# Patient Record
Sex: MALE | Race: WHITE | NOT HISPANIC OR LATINO | Employment: OTHER | URBAN - METROPOLITAN AREA
[De-identification: names, ages, dates, MRNs, and addresses within clinical notes are randomized per-mention and may not be internally consistent; named-entity substitution may affect disease eponyms.]

---

## 2018-03-30 ENCOUNTER — PATIENT OUTREACH (OUTPATIENT)
Dept: HEALTH INFORMATION MANAGEMENT | Facility: OTHER | Age: 63
End: 2018-03-30

## 2018-03-30 NOTE — PROGRESS NOTES
Attempt #:Final  [facility] Verified: yes  Verify PCP: yes    Communication Preference Obtained: yes     Annual Wellness Visit Scheduling  1. Scheduling Status:Scheduled     Care Gap Scheduling (Attempt to Schedule EACH Overdue Care Gap!)  Health Maintenance Due   Topic Date Due   • RETINAL SCREENING  04/22/2017   • IMM INFLUENZA (1) 09/01/2017   • DIABETES MONOFILAMENT / LE EXAM  12/28/2017   • FASTING LIPID PROFILE  01/04/2018   • URINE ACR / MICROALBUMIN  01/04/2018   • A1C SCREENING  02/01/2018     MyChart Activation: already active

## 2020-11-18 PROBLEM — D50.9 IRON DEFICIENCY ANEMIA: Status: ACTIVE | Noted: 2020-11-16

## 2020-11-18 PROBLEM — D64.9 ABSOLUTE ANEMIA: Status: ACTIVE | Noted: 2020-11-16

## 2022-06-14 PROBLEM — K74.60 CIRRHOSIS OF LIVER (HCC): Status: ACTIVE | Noted: 2022-03-31

## 2024-05-01 ENCOUNTER — HOSPITAL ENCOUNTER (OUTPATIENT)
Dept: RADIOLOGY | Facility: MEDICAL CENTER | Age: 69
End: 2024-05-01
Attending: INTERNAL MEDICINE
Payer: MEDICARE

## 2024-05-01 NOTE — PROGRESS NOTES
Subjective:   5/7/2024  7:53 AM  Primary care physician: Joseluis Rangel P.A.-C.  Referring Provider: Jeovanny Garay    Chief Complaint:   Chief Complaint   Patient presents with    New Patient     [facility]  LIVER LESIONS  CT: UP TO 10 CM  AFP & AFPL3% +        Diagnosis:   1. Liver lesion  MR-ABDOMEN-WITH & W/O    [id number] BASE TO MID-THIGH    Alpha-Fetoprotein Serum Tumor Marker    Bennie-Gamma-Carboxy Prothrombin    Carbohydrate Antigen 19-9    Carcinoembryonic Antigen    HCG Tumor Marker, Qnt      2. Elevated AFP        3. Alcoholic cirrhosis of liver without ascites (HCC)  MR-ABDOMEN-WITH & W/O    [id number] BASE TO MID-THIGH    Alpha-Fetoprotein Serum Tumor Marker    Bennie-Gamma-Carboxy Prothrombin    Carbohydrate Antigen 19-9    Carcinoembryonic Antigen    HCG Tumor Marker, Qnt      4. Controlled type 2 diabetes mellitus without complication, unspecified whether long term insulin use (HCC)        5. Gastroesophageal reflux disease without esophagitis        6. History of primary testicular cancer  MR-ABDOMEN-WITH & W/O    [id number] BASE TO MID-THIGH    Alpha-Fetoprotein Serum Tumor Marker    Bennie-Gamma-Carboxy Prothrombin    Carbohydrate Antigen 19-9    Carcinoembryonic Antigen    HCG Tumor Marker, Qnt        History of presenting illness:    Jamin Zamorano is a pleasant 68 y.o. male with history including iron deficiency, high testosterone and thrombocytopenia - due to his cirrhosis. The patient has a history of thrombocytopenia since 2014. This is felt to be due to his cirrhosis. His platelets run from 68,000 to 110,000.     CT LIVER on 4/19/24 noted cirrhosis with sequela of portal hypertension such as splenomegaly and trace ascites. Areas of heterogeneous masslike enhancement measuring up to 10 cm are suspicious for hepatomas, LR4, due to size. Metastatic lesions are also in the differential.  Partially visualized colonic diverticulosis.    US  ABDOMEN on 3/4/24 noted heterogeneous liver echotexture likely related to chronic liver disease. A few hypoattenuating lesions are seen bilaterally which are indeterminate. Splenomegaly possibly from portal hypertension.    Labs on 2/1/24 notable for AFP: 16 and AFP L3%: 58.3 - both elevated    He is here today for for evaluation of this newly identified liver mass.  Patient has known about his cirrhosis for a few years, felt to be related to EtOH and prior chemotherapy.  He has known gastric varices.  This was identified during surveillance with his GI doctor, initially on US then on CT scan.  Elevated tumor markers.  He has a history of testicular cancer diagnosed in 1983 for which he underwent an orchiectomy, RPLND, lung resection (right sided metastasis).  He then received systemic chemotherapy.  He has been without disease since that treatment.  Last EGD was this year.  Last colonoscopy a couple years ago.  Does not smoke, no longer drinks.  No family history.    Past Medical History:   Diagnosis Date    Arthritis     Breath shortness     Cancer (HCC) 1983    testicular cancer    Diabetes     oral meds    Elevated LFTs     Fibrosis of lung (HCC) 1983    following chemotherapy treatment    Hyperlipidemia     Hypertension     Indigestion     Pain 11-02-15    lower back, 5/10    Pneumonia 2013    Snoring     has had sleep study    Temporary low platelet count (HCC)     hx of low platelet count     Past Surgical History:   Procedure Laterality Date    CERVICAL DISK AND FUSION ANTERIOR  12/8/2014    Performed by Jamal Bhakta M.D. at SURGERY [facility]    OTHER  1983    lymph node dissection following orchiectomy    APPENDECTOMY      CHOLECYSTECTOMY      ORCHIECTOMY      OTHER NEUROLOGICAL SURG      lumbar surgery x 2    TONSILLECTOMY       No Known Allergies  Outpatient Encounter Medications as of 5/7/2024   Medication Sig Dispense Refill    losartan (COZAAR) 50 MG Tab Take 1 Tablet by mouth every day. 90  Tablet 3    metformin (GLUCOPHAGE) 1000 MG tablet TAKE 1 TABLET BY MOUTH TWICE DAILY WITH MEALS 180 Tablet 3    oxyCODONE-acetaminophen (PERCOCET-10)  MG Tab       ibuprofen (MOTRIN) 200 MG Tab Take 200 mg by mouth every 6 hours as needed.      therapeutic multivitamin-minerals (THERAGRAN-M) TABS Take 1 Tablet by mouth every day.       No facility-administered encounter medications on file as of 2024.     Social History     Socioeconomic History    Marital status:      Spouse name: Not on file    Number of children: Not on file    Years of education: Not on file    Highest education level: Not on file   Occupational History    Not on file   Tobacco Use    Smoking status: Former     Current packs/day: 0.00     Average packs/day: 0.3 packs/day for 20.0 years (5.0 ttl pk-yrs)     Types: Cigarettes     Start date: 1975     Quit date: 1995     Years since quittin.3    Smokeless tobacco: Never   Vaping Use    Vaping Use: Never used   Substance and Sexual Activity    Alcohol use: Yes     Alcohol/week: 2.0 oz     Types: 4 Shots of liquor per week     Comment: 2 a day    Drug use: No    Sexual activity: Not on file   Other Topics Concern    Not on file   Social History Narrative    Not on file     Social Determinants of Health     Financial Resource Strain: Not on file   Food Insecurity: Not on file   Transportation Needs: Not on file   Physical Activity: Not on file   Stress: Not on file   Social Connections: Not on file   Intimate Partner Violence: Not on file   Housing Stability: Not on file      Social History     Tobacco Use   Smoking Status Former    Current packs/day: 0.00    Average packs/day: 0.3 packs/day for 20.0 years (5.0 ttl pk-yrs)    Types: Cigarettes    Start date: 1975    Quit date: 1995    Years since quittin.3   Smokeless Tobacco Never     Social History     Substance and Sexual Activity   Alcohol Use Yes    Alcohol/week: 2.0 oz    Types: 4 Shots of liquor per  "week    Comment: 2 a day     Social History     Substance and Sexual Activity   Drug Use No      Family History   Problem Relation Age of Onset    Heart Disease Father      Review of Systems   Constitutional:  Negative for chills, fever, malaise/fatigue and weight loss.   HENT:  Negative for congestion, ear discharge, ear pain, hearing loss and nosebleeds.    Eyes:  Negative for blurred vision, double vision, photophobia, pain and discharge.   Respiratory:  Negative for cough, hemoptysis and sputum production.    Cardiovascular:  Negative for chest pain, palpitations, orthopnea and claudication.   Gastrointestinal:  Negative for abdominal pain, constipation, diarrhea, heartburn, nausea and vomiting.   Genitourinary:  Negative for dysuria, frequency, hematuria and urgency.   Musculoskeletal:  Negative for back pain, joint pain, myalgias and neck pain.   Skin:  Negative for itching and rash.   Neurological:  Negative for dizziness, tingling, tremors, sensory change, speech change, focal weakness and headaches.   Endo/Heme/Allergies:  Negative for environmental allergies. Does not bruise/bleed easily.   Psychiatric/Behavioral:  Negative for depression, hallucinations, substance abuse and suicidal ideas. The patient is not nervous/anxious.         Objective:   /78 (BP Location: Right arm, Patient Position: Sitting)   Pulse 91   Temp 36.8 °C (98.2 °F) (Temporal)   Ht 1.702 m (5' 7\")   Wt 88.9 kg (196 lb)   SpO2 91%   BMI 30.70 kg/m²     Physical Exam  Constitutional:       General: He is not in acute distress.     Appearance: He is not ill-appearing.   HENT:      Head: Normocephalic.   Eyes:      General: No scleral icterus.     Pupils: Pupils are equal, round, and reactive to light.   Cardiovascular:      Rate and Rhythm: Normal rate and regular rhythm.   Pulmonary:      Effort: Pulmonary effort is normal. No respiratory distress.   Abdominal:      General: Abdomen is flat. There is no distension.      " Palpations: Abdomen is soft.      Tenderness: There is no abdominal tenderness.   Skin:     Coloration: Skin is not jaundiced.   Neurological:      General: No focal deficit present.      Mental Status: He is alert and oriented to person, place, and time.         Labs  Component 02/01/24 01/09/24 09/22/23 06/27/23 06/27/23 06/15/23   WBC Count 4.4 3.8 3.7 4.0 -- 4.7   RBC 4.11 Low  4.13 Low  4.09 Low  4.83 -- 4.92   Hemoglobin 12.5 Low  13.0 13.1 15.1 -- 15.6   Hematocrit 37.6 Low  38.7 Low  38.5 Low  45.3 -- 45.5   Mean Corpuscular Volume 91.6 93.6 94.0 93.9 -- 92.5   Mean Corpuscular Hemoglobin 30.5 31.5 31.9 31.3 -- 31.7   Mean Corpuscular HGB Concentration 33.3 33.6 34.0 33.3 -- 34.2   Red Cell Distribution Width 13.9 14.3 High  14.8 High  16.7 High  -- 16.7 High    Platelet Count 92 Low  59 Low  58 Low  53 Low  -- 66 Low    Mean Platelet Volume 10.6 High  10.6 High  10.6 High  11.1 High  -- 10.0   Neutrophil % 71.0 69.7 68.3 -- -- 69.2   Lymphocyte % 16.7 17.2 16.8 -- 19 18.1   Monocyte % 7.7 8.9 10.2 -- 10 9.6   Eosinophil % 4.2 3.8 4.4 -- 3 2.8   Basophil % 0.4 0.4 0.3 -- 1 0.3   Neutrophil # 3.20 2.70 2.50 -- -- 3.20   Lymphocyte # 0.70 Low  0.70 Low  0.60 Low  -- 0.76 Low  0.80 Low    Monocyte # 0.30 0.30 0.40 -- 0.40 0.40   Eosinophil # 0.20 0.10 0.20 -- 0.12 0.10   Basophil # 0.00 0.00 0.00 -- 0.04 0.00     Component 02/01/24 06/15/23 03/30/22 11/16/21 11/16/21 09/03/21   Sodium S/P 138 138 137 -- -- --   Potassium 4.5 5.4 High  4.4 -- -- 4.4   Chloride 103 104 105 -- -- 106   Carbon Dioxide 28 28 25 -- -- --   Alkaline Phosphatase 87 72 66 -- -- --   AST 35 30 20 -- -- 19   ALT 44 40 21 -- -- 20   BUN 12 12 15 14 -- 12   Creatinine S/P 0.94 0.97 0.90 -- 0.95 --   Calcium 9.5 9.5 9.2 -- -- 9.4   Protein, Total, S/P 6.5 6.4 6.5 -- -- --   Albumin 3.9 4.0 3.8 -- -- 4.2   A/G Ratio 1.5 1.7 1.4 -- -- --   Glomerular Filtration Rate (GFR) 79  77  84  -- -- --   Globulins, Total 2.6 2.4 Low  2.7 -- -- --    Glucose S/P 151 High  106 High  111 High  -- -- --   Bilirubin, Total 0.7 1.7 0.8 -- -- 0.8   Anion Gap 7 6 7 -- -- 7      AFP on 2/1/24: 16 - elevated    AFP L3% on 2/1/24: 58 - elevated    Imaging  CT LIVER (4/19/24)  Impression  1.  Cirrhosis with sequela of portal hypertension such as splenomegaly and trace  ascites. Areas of heterogeneous masslike enhancement measuring up to 10 cm are  suspicious for hepatomas, LR4, due to size. Metastatic lesions are also in the  differential. Consider follow-up with MRI with Eovist IV contrast.  2.  Partially visualized colonic diverticulosis.    US ABDOMEN (3/4/24)  Impression  1.  Heterogeneous liver echotexture likely related to chronic liver disease.    2.  A few hypoattenuating lesions are seen bilaterally which are indeterminate.  Recommend a CT of the abdomen and pelvis without contrast (liver mass protocol)  for further evaluation.    3.  Splenomegaly possibly from portal hypertension.      Pathology  N/A    Procedures  EGD (3/22/24)        Diagnosis:     1. Liver lesion  MR-ABDOMEN-WITH & W/O    [id number] BASE TO MID-THIGH    Alpha-Fetoprotein Serum Tumor Marker    Bennie-Gamma-Carboxy Prothrombin    Carbohydrate Antigen 19-9    Carcinoembryonic Antigen    HCG Tumor Marker, Qnt      2. Elevated AFP        3. Alcoholic cirrhosis of liver without ascites (HCC)  MR-ABDOMEN-WITH & W/O    [id number] BASE TO MID-THIGH    Alpha-Fetoprotein Serum Tumor Marker    Bennie-Gamma-Carboxy Prothrombin    Carbohydrate Antigen 19-9    Carcinoembryonic Antigen    HCG Tumor Marker, Qnt      4. Controlled type 2 diabetes mellitus without complication, unspecified whether long term insulin use (HCC)        5. Gastroesophageal reflux disease without esophagitis        6. History of primary testicular cancer  MR-ABDOMEN-WITH & W/O    [id number] BASE TO MID-THIGH    Alpha-Fetoprotein Serum Tumor Marker    Bennie-Gamma-Carboxy Prothrombin    Carbohydrate Antigen 19-9     Carcinoembryonic Antigen    HCG Tumor Marker, Qnt          Medical Decision Making:  Today's Assessment / Status / Plan:     69M with history of testicular cancer in 1983 s/p orchiectomy, RPLND, right lung resection (metastasis) followed by chemotherapy, cirrhosis 2/2 EtOH and chemotherapy who presents with a newly identified right sided liver mass, elevated AFP, L3%.    LIRADS 4 lesion on CT scan, showed patient his images today.  Need to discern if this is HCC vs testicular recurrence vs other metastasitc lesion vs cholangio.  Will order CT guided liver biopsy, MRI, PET scan, repeat tumor markers.  Once we have a diagnosis will determine treatment plan.  If HCC discussed unresectable due to cirrhosis would plan for liver directed therapy.    I, Brad Sam MD have entered, reviewed and confirmed the above diagnosis related to this patient on this date of service, May 7, 2024     Brad Sam M.D.

## 2024-05-07 ENCOUNTER — TELEPHONE (OUTPATIENT)
Dept: SURGICAL ONCOLOGY | Facility: MEDICAL CENTER | Age: 69
End: 2024-05-07

## 2024-05-07 ENCOUNTER — OFFICE VISIT (OUTPATIENT)
Dept: SURGICAL ONCOLOGY | Facility: MEDICAL CENTER | Age: 69
End: 2024-05-07
Payer: MEDICARE

## 2024-05-07 VITALS
BODY MASS INDEX: 30.76 KG/M2 | OXYGEN SATURATION: 91 % | HEART RATE: 91 BPM | TEMPERATURE: 98.2 F | SYSTOLIC BLOOD PRESSURE: 138 MMHG | DIASTOLIC BLOOD PRESSURE: 78 MMHG | HEIGHT: 67 IN | WEIGHT: 196 LBS

## 2024-05-07 DIAGNOSIS — K21.9 GASTROESOPHAGEAL REFLUX DISEASE WITHOUT ESOPHAGITIS: ICD-10-CM

## 2024-05-07 DIAGNOSIS — K76.9 LIVER LESION: ICD-10-CM

## 2024-05-07 DIAGNOSIS — E11.9 CONTROLLED TYPE 2 DIABETES MELLITUS WITHOUT COMPLICATION, UNSPECIFIED WHETHER LONG TERM INSULIN USE (HCC): ICD-10-CM

## 2024-05-07 DIAGNOSIS — R77.2 ELEVATED AFP: ICD-10-CM

## 2024-05-07 DIAGNOSIS — K70.30 ALCOHOLIC CIRRHOSIS OF LIVER WITHOUT ASCITES (HCC): ICD-10-CM

## 2024-05-07 DIAGNOSIS — Z85.47 HISTORY OF PRIMARY TESTICULAR CANCER: ICD-10-CM

## 2024-05-07 PROCEDURE — 99204 OFFICE O/P NEW MOD 45 MIN: CPT | Performed by: SURGERY

## 2024-05-07 PROCEDURE — 3075F SYST BP GE 130 - 139MM HG: CPT | Performed by: SURGERY

## 2024-05-07 PROCEDURE — 3078F DIAST BP <80 MM HG: CPT | Performed by: SURGERY

## 2024-05-07 ASSESSMENT — ENCOUNTER SYMPTOMS
NERVOUS/ANXIOUS: 0
HEADACHES: 0
MYALGIAS: 0
SENSORY CHANGE: 0
EYE DISCHARGE: 0
WEIGHT LOSS: 0
TINGLING: 0
PHOTOPHOBIA: 0
FEVER: 0
FOCAL WEAKNESS: 0
BLURRED VISION: 0
DIARRHEA: 0
ORTHOPNEA: 0
DOUBLE VISION: 0
SPEECH CHANGE: 0
EYE PAIN: 0
CLAUDICATION: 0
PALPITATIONS: 0
TREMORS: 0
HEMOPTYSIS: 0
SPUTUM PRODUCTION: 0
NAUSEA: 0
VOMITING: 0
DEPRESSION: 0
DIZZINESS: 0
ABDOMINAL PAIN: 0
CHILLS: 0
HALLUCINATIONS: 0
NECK PAIN: 0
COUGH: 0
BACK PAIN: 0
CONSTIPATION: 0
HEARTBURN: 0
BRUISES/BLEEDS EASILY: 0

## 2024-05-07 ASSESSMENT — LIFESTYLE VARIABLES: SUBSTANCE_ABUSE: 0

## 2024-05-07 ASSESSMENT — FIBROSIS 4 INDEX: FIB4 SCORE: 3.66

## 2024-05-28 ENCOUNTER — TELEPHONE (OUTPATIENT)
Dept: SURGICAL ONCOLOGY | Facility: MEDICAL CENTER | Age: 69
End: 2024-05-28
Payer: MEDICARE

## 2024-06-12 NOTE — PROGRESS NOTES
Subjective:   6/26/2024  9:19 AM  Primary care physician: Joseluis Rangel P.A.-C.  Referring Provider: Jeovanny Garay     Chief Complaint:   Chief Complaint   Patient presents with    Follow-Up     HCC VS TESTICULAR RECURRENCE VS METASTATIC LESION VS CHOLANGIO  MRI 6/13  NO LIVER BX - DENIED BY INSURANCE        Diagnosis:   1. Liver lesion  IR-LIVER BX PROCEDURE      2. Elevated AFP        3. Alcoholic cirrhosis of liver without ascites (HCC)        4. Gastroesophageal reflux disease without esophagitis        5. Controlled type 2 diabetes mellitus without complication, unspecified whether long term insulin use (HCC)          History of presenting illness:    Jamin Zamorano is a pleasant 68 y.o. male with history including iron deficiency, high testosterone and thrombocytopenia - due to his cirrhosis. The patient has a history of thrombocytopenia since 2014. This is felt to be due to his cirrhosis. His platelets run from 68,000 to 110,000.      CT LIVER on 4/19/24 noted cirrhosis with sequela of portal hypertension such as splenomegaly and trace ascites. Areas of heterogeneous masslike enhancement measuring up to 10 cm are suspicious for hepatomas, LR4, due to size. Metastatic lesions are also in the differential.  Partially visualized colonic diverticulosis.     US ABDOMEN on 3/4/24 noted heterogeneous liver echotexture likely related to chronic liver disease. A few hypoattenuating lesions are seen bilaterally which are indeterminate. Splenomegaly possibly from portal hypertension.     Labs on 2/1/24 notable for AFP: 16 and AFP L3%: 58.3 - both elevated     He is here today for for evaluation of this newly identified liver mass.  Patient has known about his cirrhosis for a few years, felt to be related to EtOH and prior chemotherapy.  He has known gastric varices.  This was identified during surveillance with his GI doctor, initially on US then on CT scan.   Elevated tumor markers.  He has a history of testicular cancer diagnosed in 1983 for which he underwent an orchiectomy, RPLND, lung resection (right sided metastasis).  He then received systemic chemotherapy.  He has been without disease since that treatment.  Last EGD was this year.  Last colonoscopy a couple years ago.  Does not smoke, no longer drinks.  No family history.    Update 6/26/24  CT PET on 5/31/24 noted no evidence of hypermetabolic metastatic disease. Normal PET CT appearance of the liver however hepatocellular carcinoma is often not FDG active.     MR ABDOMEN on 6/13/24 noted multifocal arterial enhancing lesions with washout scattered throughout the hepatic segments, with dominant mass extending along the posterior medial right   lobe at segment 6 and 7. Most concerning for multifocal hepatocellular carcinoma   in the setting of hepatocellular disease/cirrhosis.  LIRADS 5. Mildly enlarged, potential metastatic disease in the periportal region lymph nodes. Moderate to marked splenomegaly and other sequela of portal hypertension.     He is here today for follow-up.  In general he has been fatigued and stressed in regards to what is going on in his liver.  He has not had any major new issues since I last saw him in the office.  We did discuss his MRI and PET scans today in clinic.    Past Medical History:   Diagnosis Date    Arthritis     Breath shortness     Cancer (HCC) 1983    testicular cancer    Diabetes     oral meds    Elevated LFTs     Fibrosis of lung (HCC) 1983    following chemotherapy treatment    Hyperlipidemia     Hypertension     Indigestion     Pain 11-02-15    lower back, 5/10    Pneumonia 2013    Snoring     has had sleep study    Temporary low platelet count (HCC)     hx of low platelet count     Past Surgical History:   Procedure Laterality Date    CERVICAL DISK AND FUSION ANTERIOR  12/8/2014    Performed by Jamal Bhakta M.D. at SURGERY [facility]  1983    lymph  node dissection following orchiectomy    APPENDECTOMY      CHOLECYSTECTOMY      ORCHIECTOMY      OTHER NEUROLOGICAL SURG      lumbar surgery x 2    TONSILLECTOMY       No Known Allergies  Outpatient Encounter Medications as of 2024   Medication Sig Dispense Refill    losartan (COZAAR) 50 MG Tab Take 1 Tablet by mouth every day. 90 Tablet 3    metformin (GLUCOPHAGE) 1000 MG tablet TAKE 1 TABLET BY MOUTH TWICE DAILY WITH MEALS 180 Tablet 3    oxyCODONE-acetaminophen (PERCOCET-10)  MG Tab       ibuprofen (MOTRIN) 200 MG Tab Take 200 mg by mouth every 6 hours as needed.      therapeutic multivitamin-minerals (THERAGRAN-M) TABS Take 1 Tablet by mouth every day.       No facility-administered encounter medications on file as of 2024.     Social History     Socioeconomic History    Marital status:      Spouse name: Not on file    Number of children: Not on file    Years of education: Not on file    Highest education level: Not on file   Occupational History    Not on file   Tobacco Use    Smoking status: Former     Current packs/day: 0.00     Average packs/day: 0.3 packs/day for 20.0 years (5.0 ttl pk-yrs)     Types: Cigarettes     Start date: 1975     Quit date: 1995     Years since quittin.5    Smokeless tobacco: Never   Vaping Use    Vaping status: Never Used   Substance and Sexual Activity    Alcohol use: Yes     Alcohol/week: 2.0 oz     Types: 4 Shots of liquor per week     Comment: 2 a day    Drug use: No    Sexual activity: Not on file   Other Topics Concern    Not on file   Social History Narrative    Not on file     Social Determinants of Health     Financial Resource Strain: Low Risk  (2023)    Received from [facility]    Overall Financial Resource Strain (CARDIA)     Difficulty of Paying Living Expenses: Not hard at all   Food Insecurity: No Food Insecurity (2023)    Received from [facility]    Hunger Vital Sign     Worried About Running  Out of Food in the Last Year: Never true     Ran Out of Food in the Last Year: Never true   Transportation Needs: No Transportation Needs (9/25/2023)    Received from [facility]    PRAPARE - Transportation     Lack of Transportation (Medical): No     Lack of Transportation (Non-Medical): No   Physical Activity: Patient Declined (3/22/2023)    Received from [facility]    Exercise Vital Sign     Days of Exercise per Week: Patient declined     Minutes of Exercise per Session: Patient declined   Stress: Patient Declined (3/22/2023)    Received from [facility]    [employer] - Occupational Stress Questionnaire     Feeling of Stress : Patient declined   Social Connections: Patient Declined (3/22/2023)    Received from [facility]    Social Connection and Isolation Panel [NHANES]     Frequency of Communication with Friends and Family: Patient declined     Frequency of Social Gatherings with Friends and Family: Patient declined     Attends Christianity Services: Patient declined     Active Member of Clubs or Organizations: Patient declined     Attends Club or Organization Meetings: Patient declined     Marital Status: Patient declined   Intimate Partner Violence: Patient Declined (3/22/2023)    Received from [facility]    Humiliation, Afraid, Rape, and Kick questionnaire     Fear of Current or Ex-Partner: Patient declined     Emotionally Abused: Patient declined     Physically Abused: Patient declined     Sexually Abused: Patient declined   Housing Stability: Unknown (3/22/2023)    Received from [facility]    Housing Stability Vital Sign     Unable to Pay for Housing in the Last Year: No     Number of Places Lived in the Last Year: Not on file     Unstable Housing in the Last Year: No      Social History     Tobacco Use   Smoking Status Former    Current packs/day: 0.00    Average packs/day: 0.3 packs/day for  "20.0 years (5.0 ttl pk-yrs)    Types: Cigarettes    Start date: 1975    Quit date: 1995    Years since quittin.5   Smokeless Tobacco Never     Social History     Substance and Sexual Activity   Alcohol Use Yes    Alcohol/week: 2.0 oz    Types: 4 Shots of liquor per week    Comment: 2 a day     Social History     Substance and Sexual Activity   Drug Use No      Family History   Problem Relation Age of Onset    Heart Disease Father      Review of Systems   Constitutional:  Positive for malaise/fatigue. Negative for chills, fever and weight loss.   HENT:  Negative for congestion, ear discharge, ear pain, hearing loss and nosebleeds.    Eyes:  Negative for blurred vision, double vision, photophobia, pain and discharge.   Respiratory:  Negative for cough, hemoptysis and sputum production.    Cardiovascular:  Negative for chest pain, palpitations, orthopnea and claudication.   Gastrointestinal:  Negative for abdominal pain, constipation, diarrhea, heartburn, nausea and vomiting.   Genitourinary:  Negative for dysuria, frequency, hematuria and urgency.   Musculoskeletal:  Negative for back pain, joint pain, myalgias and neck pain.   Skin:  Negative for itching and rash.   Neurological:  Negative for dizziness, tingling, tremors, sensory change, speech change, focal weakness and headaches.   Endo/Heme/Allergies:  Negative for environmental allergies. Does not bruise/bleed easily.   Psychiatric/Behavioral:  Negative for depression, hallucinations, substance abuse and suicidal ideas. The patient is not nervous/anxious.         Objective:   /66 (BP Location: Left arm, Patient Position: Sitting, BP Cuff Size: Small adult)   Pulse 85   Temp 37.2 °C (98.9 °F) (Temporal)   Ht 1.702 m (5' 7\")   Wt 87.5 kg (193 lb)   SpO2 96%   BMI 30.23 kg/m²     Physical Exam  Constitutional:       General: He is not in acute distress.     Appearance: He is not ill-appearing.   HENT:      Head: Normocephalic.   Eyes:      " General: No scleral icterus.     Pupils: Pupils are equal, round, and reactive to light.   Cardiovascular:      Rate and Rhythm: Normal rate and regular rhythm.   Pulmonary:      Effort: Pulmonary effort is normal. No respiratory distress.   Abdominal:      General: Abdomen is flat. There is no distension.      Palpations: Abdomen is soft.      Tenderness: There is no abdominal tenderness.   Skin:     Coloration: Skin is not jaundiced.   Neurological:      General: No focal deficit present.      Mental Status: He is alert and oriented to person, place, and time.         Labs  Component 02/01/24 01/09/24 09/22/23 06/27/23 06/27/23 06/15/23   WBC Count 4.4 3.8 3.7 4.0 -- 4.7   RBC 4.11 Low  4.13 Low  4.09 Low  4.83 -- 4.92   Hemoglobin 12.5 Low  13.0 13.1 15.1 -- 15.6   Hematocrit 37.6 Low  38.7 Low  38.5 Low  45.3 -- 45.5   Mean Corpuscular Volume 91.6 93.6 94.0 93.9 -- 92.5   Mean Corpuscular Hemoglobin 30.5 31.5 31.9 31.3 -- 31.7   Mean Corpuscular HGB Concentration 33.3 33.6 34.0 33.3 -- 34.2   Red Cell Distribution Width 13.9 14.3 High  14.8 High  16.7 High  -- 16.7 High    Platelet Count 92 Low  59 Low  58 Low  53 Low  -- 66 Low    Mean Platelet Volume 10.6 High  10.6 High  10.6 High  11.1 High  -- 10.0   Neutrophil % 71.0 69.7 68.3 -- -- 69.2   Lymphocyte % 16.7 17.2 16.8 -- 19 18.1   Monocyte % 7.7 8.9 10.2 -- 10 9.6   Eosinophil % 4.2 3.8 4.4 -- 3 2.8   Basophil % 0.4 0.4 0.3 -- 1 0.3   Neutrophil # 3.20 2.70 2.50 -- -- 3.20   Lymphocyte # 0.70 Low  0.70 Low  0.60 Low  -- 0.76 Low  0.80 Low    Monocyte # 0.30 0.30 0.40 -- 0.40 0.40   Eosinophil # 0.20 0.10 0.20 -- 0.12 0.10   Basophil # 0.00 0.00 0.00 -- 0.04 0.00      Component 02/01/24 06/15/23 03/30/22 11/16/21 11/16/21 09/03/21   Sodium S/P 138 138 137 -- -- --   Potassium 4.5 5.4 High  4.4 -- -- 4.4   Chloride 103 104 105 -- -- 106   Carbon Dioxide 28 28 25 -- -- --   Alkaline Phosphatase 87 72 66 -- -- --   AST 35 30 20 -- -- 19   ALT 44 40 21 -- -- 20    BUN 12 12 15 14 -- 12   Creatinine S/P 0.94 0.97 0.90 -- 0.95 --   Calcium 9.5 9.5 9.2 -- -- 9.4   Protein, Total, S/P 6.5 6.4 6.5 -- -- --   Albumin 3.9 4.0 3.8 -- -- 4.2   A/G Ratio 1.5 1.7 1.4 -- -- --   Glomerular Filtration Rate (GFR) 79  77  84  -- -- --   Globulins, Total 2.6 2.4 Low  2.7 -- -- --   Glucose S/P 151 High  106 High  111 High  -- -- --   Bilirubin, Total 0.7 1.7 0.8 -- -- 0.8   Anion Gap 7 6 7 -- -- 7      AFP on 2/1/24: 16 - elevated     AFP L3% on 2/1/24: 58 - elevated     Imaging  MR ABDOMEN (6/13/24)  IMPRESSION  1.  Multifocal arterial enhancing lesions with washout scattered throughout the   hepatic segments, with dominant mass extending along the posterior medial right   lobe at segment 6 and 7. Most concerning for multifocal hepatocellular carcinoma   in the setting of hepatocellular disease/cirrhosis.  LIRADS 5.   2.  Mildly enlarged, potential metastatic disease in the periportal region lymph   nodes.   3. Moderate to marked splenomegaly and other sequela of portal hypertension.     CT PET (5/31/24)  Impression    1.  No evidence of hypermetabolic metastatic disease.    2.  Normal PET CT appearance of the liver however hepatocellular carcinoma is  often not FDG active. Consider continued follow-up with liver protocol MRI or  CT.    CT LIVER (4/19/24)  Impression  1.  Cirrhosis with sequela of portal hypertension such as splenomegaly and trace  ascites. Areas of heterogeneous masslike enhancement measuring up to 10 cm are  suspicious for hepatomas, LR4, due to size. Metastatic lesions are also in the  differential. Consider follow-up with MRI with Eovist IV contrast.  2.  Partially visualized colonic diverticulosis.     US ABDOMEN (3/4/24)  Impression  1.  Heterogeneous liver echotexture likely related to chronic liver disease.     2.  A few hypoattenuating lesions are seen bilaterally which are indeterminate.  Recommend a CT of the abdomen and pelvis without contrast (liver mass  protocol)  for further evaluation.     3.  Splenomegaly possibly from portal hypertension.        Pathology  N/A     Procedures    EGD (3/22/24)         Diagnosis:     1. Liver lesion  IR-LIVER BX PROCEDURE      2. Elevated AFP        3. Alcoholic cirrhosis of liver without ascites (HCC)        4. Gastroesophageal reflux disease without esophagitis        5. Controlled type 2 diabetes mellitus without complication, unspecified whether long term insulin use (HCC)            Medical Decision Making:  Today's Assessment / Status / Plan:     69M with history of testicular cancer in 1983 s/p orchiectomy, RPLND, right lung resection (metastasis) followed by chemotherapy, cirrhosis 2/2 EtOH and chemotherapy who presents with a newly identified right sided liver mass, elevated AFP, L3%.     Today in clinic we discussed his PET scan and his MRI.  On his MRI it does appear that this would be most consistent with hepatocellular carcinoma with multifocal disease on the right side.  I still do have concern given his history of aggressive testicular cancer that this could represent metastasis.  I do think he still needs a liver biopsy in order to determine exactly what were dealing with in order to appropriately guide management.  I think regardless if this is HCC or recurrent testicular cancer he is likely going to need systemic therapy given the multifocal nature in the liver.  He may be a candidate down the line for liver directed therapies depending on his response to systemic therapy.  I put him on the presented tumor board next week, I placed referral to medical oncology to get him established and he will undergo a liver biopsy.  Once we have those results we will finalize the treatment plan of care.    I, Brad Sam MD have entered, reviewed and confirmed the above diagnosis related to this patient on this date of service, June 26, 2024     Brad aSm M.D.

## 2024-06-24 ENCOUNTER — HOSPITAL ENCOUNTER (OUTPATIENT)
Dept: RADIOLOGY | Facility: MEDICAL CENTER | Age: 69
End: 2024-06-24
Attending: SURGERY
Payer: MEDICARE

## 2024-06-26 ENCOUNTER — OFFICE VISIT (OUTPATIENT)
Dept: SURGICAL ONCOLOGY | Facility: MEDICAL CENTER | Age: 69
End: 2024-06-26
Payer: MEDICARE

## 2024-06-26 VITALS
TEMPERATURE: 98.9 F | DIASTOLIC BLOOD PRESSURE: 66 MMHG | HEIGHT: 67 IN | BODY MASS INDEX: 30.29 KG/M2 | SYSTOLIC BLOOD PRESSURE: 130 MMHG | OXYGEN SATURATION: 96 % | WEIGHT: 193 LBS | HEART RATE: 85 BPM

## 2024-06-26 DIAGNOSIS — K21.9 GASTROESOPHAGEAL REFLUX DISEASE WITHOUT ESOPHAGITIS: ICD-10-CM

## 2024-06-26 DIAGNOSIS — K76.9 LIVER LESION: ICD-10-CM

## 2024-06-26 DIAGNOSIS — K70.30 ALCOHOLIC CIRRHOSIS OF LIVER WITHOUT ASCITES (HCC): ICD-10-CM

## 2024-06-26 DIAGNOSIS — R77.2 ELEVATED AFP: ICD-10-CM

## 2024-06-26 DIAGNOSIS — E11.9 CONTROLLED TYPE 2 DIABETES MELLITUS WITHOUT COMPLICATION, UNSPECIFIED WHETHER LONG TERM INSULIN USE (HCC): ICD-10-CM

## 2024-06-26 ASSESSMENT — ENCOUNTER SYMPTOMS
NERVOUS/ANXIOUS: 0
CONSTIPATION: 0
FEVER: 0
EYE DISCHARGE: 0
CHILLS: 0
HEARTBURN: 0
PALPITATIONS: 0
FOCAL WEAKNESS: 0
PHOTOPHOBIA: 0
EYE PAIN: 0
NECK PAIN: 0
ORTHOPNEA: 0
TREMORS: 0
HEMOPTYSIS: 0
BLURRED VISION: 0
DIARRHEA: 0
CLAUDICATION: 0
TINGLING: 0
WEIGHT LOSS: 0
BACK PAIN: 0
MYALGIAS: 0
SPEECH CHANGE: 0
DOUBLE VISION: 0
DEPRESSION: 0
SPUTUM PRODUCTION: 0
ABDOMINAL PAIN: 0
HALLUCINATIONS: 0
VOMITING: 0
NAUSEA: 0
COUGH: 0
BRUISES/BLEEDS EASILY: 0
SENSORY CHANGE: 0
HEADACHES: 0
DIZZINESS: 0

## 2024-06-26 ASSESSMENT — LIFESTYLE VARIABLES: SUBSTANCE_ABUSE: 0

## 2024-06-26 ASSESSMENT — FIBROSIS 4 INDEX: FIB4 SCORE: 3.66

## 2024-07-03 ENCOUNTER — TELEPHONE (OUTPATIENT)
Dept: SURGICAL ONCOLOGY | Facility: MEDICAL CENTER | Age: 69
End: 2024-07-03
Payer: MEDICARE

## 2024-07-03 DIAGNOSIS — C22.0 HEPATOCELLULAR CARCINOMA (HCC): ICD-10-CM

## 2024-07-08 ENCOUNTER — TELEPHONE (OUTPATIENT)
Dept: SURGICAL ONCOLOGY | Facility: MEDICAL CENTER | Age: 69
End: 2024-07-08
Payer: MEDICARE

## 2024-07-08 ENCOUNTER — PATIENT OUTREACH (OUTPATIENT)
Dept: ONCOLOGY | Facility: MEDICAL CENTER | Age: 69
End: 2024-07-08
Payer: MEDICARE

## 2024-07-09 ENCOUNTER — PATIENT OUTREACH (OUTPATIENT)
Dept: ONCOLOGY | Facility: MEDICAL CENTER | Age: 69
End: 2024-07-09
Payer: MEDICARE

## 2024-08-29 ENCOUNTER — HOSPITAL ENCOUNTER (OUTPATIENT)
Facility: MEDICAL CENTER | Age: 69
End: 2024-08-29
Attending: STUDENT IN AN ORGANIZED HEALTH CARE EDUCATION/TRAINING PROGRAM
Payer: MEDICARE

## 2024-08-29 LAB
FORWARD REASON: SPWHY: NORMAL
FORWARDED TO LAB: SPWHR: NORMAL
SPECIMEN SENT (2ND): SPWT2: NORMAL
SPECIMEN SENT: SPWT1: NORMAL